# Patient Record
Sex: FEMALE | Race: WHITE | NOT HISPANIC OR LATINO | Employment: FULL TIME | URBAN - METROPOLITAN AREA
[De-identification: names, ages, dates, MRNs, and addresses within clinical notes are randomized per-mention and may not be internally consistent; named-entity substitution may affect disease eponyms.]

---

## 2023-09-26 ENCOUNTER — HOSPITAL ENCOUNTER (OUTPATIENT)
Dept: DATA CONVERSION | Facility: HOSPITAL | Age: 58
Discharge: HOME | End: 2023-09-26

## 2023-09-26 DIAGNOSIS — Z12.31 ENCOUNTER FOR SCREENING MAMMOGRAM FOR MALIGNANT NEOPLASM OF BREAST: ICD-10-CM

## 2023-10-01 ENCOUNTER — HOSPITAL ENCOUNTER (OUTPATIENT)
Dept: RADIOLOGY | Facility: HOSPITAL | Age: 58
Discharge: HOME | End: 2023-10-01
Payer: COMMERCIAL

## 2023-10-01 DIAGNOSIS — Z13.6 ENCOUNTER FOR SCREENING FOR CARDIOVASCULAR DISORDERS: ICD-10-CM

## 2023-10-01 PROCEDURE — 75571 CT HRT W/O DYE W/CA TEST: CPT

## 2023-11-03 ENCOUNTER — OFFICE VISIT (OUTPATIENT)
Dept: PRIMARY CARE | Facility: CLINIC | Age: 58
End: 2023-11-03
Payer: COMMERCIAL

## 2023-11-03 VITALS
HEART RATE: 88 BPM | SYSTOLIC BLOOD PRESSURE: 118 MMHG | HEIGHT: 62 IN | OXYGEN SATURATION: 98 % | DIASTOLIC BLOOD PRESSURE: 76 MMHG | WEIGHT: 133 LBS | BODY MASS INDEX: 24.48 KG/M2

## 2023-11-03 DIAGNOSIS — J00 NASOPHARYNGITIS ACUTE: ICD-10-CM

## 2023-11-03 DIAGNOSIS — B30.9 VIRAL CONJUNCTIVITIS: Primary | ICD-10-CM

## 2023-11-03 PROCEDURE — 99212 OFFICE O/P EST SF 10 MIN: CPT

## 2023-11-03 PROCEDURE — 1036F TOBACCO NON-USER: CPT

## 2023-11-03 RX ORDER — LEVOTHYROXINE SODIUM 125 UG/1
125 TABLET ORAL DAILY
COMMUNITY
End: 2023-11-27 | Stop reason: SDUPTHER

## 2023-11-03 ASSESSMENT — ENCOUNTER SYMPTOMS
HEADACHES: 1
EYE ITCHING: 0
DOUBLE VISION: 0
SORE THROAT: 0
FEVER: 0
EYE DISCHARGE: 1
RHINORRHEA: 0
EYE PAIN: 0
EYE REDNESS: 1

## 2023-11-03 ASSESSMENT — PATIENT HEALTH QUESTIONNAIRE - PHQ9
1. LITTLE INTEREST OR PLEASURE IN DOING THINGS: NOT AT ALL
2. FEELING DOWN, DEPRESSED OR HOPELESS: NOT AT ALL
SUM OF ALL RESPONSES TO PHQ9 QUESTIONS 1 AND 2: 0

## 2023-11-03 ASSESSMENT — PAIN SCALES - GENERAL: PAINLEVEL: 0-NO PAIN

## 2023-11-03 NOTE — PROGRESS NOTES
"Subjective   Patient ID: Allyson FRENCH is a 58 y.o. female who presents for Conjunctivitis (Pt c/o eye drainage, redness /la).    Conjunctivitis   The current episode started 2 days ago. The onset was sudden. The problem has been gradually worsening. Associated symptoms include congestion, headaches, eye discharge and eye redness. Pertinent negatives include no fever, no decreased vision, no double vision, no eye itching, no rhinorrhea, no sore throat and no eye pain. The eye pain is mild.        Review of Systems   Constitutional:  Negative for fever.   HENT:  Positive for congestion. Negative for rhinorrhea and sore throat.    Eyes:  Positive for discharge and redness. Negative for double vision, pain and itching.   Neurological:  Positive for headaches.       Objective   /76   Pulse 88   Ht 1.575 m (5' 2\")   Wt 60.3 kg (133 lb)   SpO2 98%   BMI 24.33 kg/m²     Physical Exam  Constitutional:       General: She is not in acute distress.     Appearance: Normal appearance.   HENT:      Right Ear: Tympanic membrane and ear canal normal.      Left Ear: Ear canal normal. Tympanic membrane is scarred.      Nose: Congestion present. No rhinorrhea.      Right Turbinates: Enlarged and swollen. Not pale.      Left Turbinates: Enlarged and swollen. Not pale.      Mouth/Throat:      Mouth: Mucous membranes are moist. No oral lesions.      Pharynx: Uvula midline. No oropharyngeal exudate or posterior oropharyngeal erythema.   Eyes:      Conjunctiva/sclera:      Right eye: Right conjunctiva is injected. Exudate present.      Left eye: Left conjunctiva is injected. Exudate present.   Cardiovascular:      Rate and Rhythm: Normal rate and regular rhythm.   Pulmonary:      Effort: Pulmonary effort is normal.   Lymphadenopathy:      Cervical: No cervical adenopathy.   Skin:     General: Skin is warm and dry.      Findings: No rash.   Neurological:      Mental Status: She is alert.   Psychiatric:         Mood and Affect: " Mood and affect normal.         Assessment/Plan   Diagnoses and all orders for this visit:  Viral conjunctivitis  Nasopharyngitis acute  -Instructed to use Patady OTC eye drops and warm compresses for pink eye. Educated not bacterial and does not need antibiotics.   -Educated with supportive care of increase fluids, rest, nasal saline 4x a day, Musinex, Zyrtec-D

## 2023-11-24 ENCOUNTER — LAB (OUTPATIENT)
Dept: LAB | Facility: LAB | Age: 58
End: 2023-11-24
Payer: COMMERCIAL

## 2023-11-24 DIAGNOSIS — E55.9 VITAMIN D DEFICIENCY, UNSPECIFIED: ICD-10-CM

## 2023-11-24 DIAGNOSIS — E03.9 HYPOTHYROIDISM, UNSPECIFIED: ICD-10-CM

## 2023-11-24 DIAGNOSIS — R53.83 OTHER FATIGUE: ICD-10-CM

## 2023-11-24 DIAGNOSIS — E78.5 HYPERLIPIDEMIA, UNSPECIFIED: Primary | ICD-10-CM

## 2023-11-24 DIAGNOSIS — Z00.00 ENCOUNTER FOR GENERAL ADULT MEDICAL EXAMINATION WITHOUT ABNORMAL FINDINGS: ICD-10-CM

## 2023-11-24 DIAGNOSIS — E53.8 DEFICIENCY OF OTHER SPECIFIED B GROUP VITAMINS: ICD-10-CM

## 2023-11-24 DIAGNOSIS — R35.0 FREQUENCY OF MICTURITION: ICD-10-CM

## 2023-11-24 LAB
25(OH)D3 SERPL-MCNC: 40 NG/ML (ref 31–100)
ALBUMIN SERPL-MCNC: 4.5 G/DL (ref 3.5–5)
ALP BLD-CCNC: 80 U/L (ref 35–125)
ALT SERPL-CCNC: 22 U/L (ref 5–40)
ANION GAP SERPL CALC-SCNC: 11 MMOL/L
APPEARANCE UR: CLEAR
AST SERPL-CCNC: 24 U/L (ref 5–40)
BILIRUB SERPL-MCNC: 0.4 MG/DL (ref 0.1–1.2)
BILIRUB UR STRIP.AUTO-MCNC: NEGATIVE MG/DL
BUN SERPL-MCNC: 16 MG/DL (ref 8–25)
CALCIUM SERPL-MCNC: 9.8 MG/DL (ref 8.5–10.4)
CHLORIDE SERPL-SCNC: 102 MMOL/L (ref 97–107)
CHOLEST SERPL-MCNC: 255 MG/DL (ref 133–200)
CHOLEST/HDLC SERPL: 4.2 {RATIO}
CO2 SERPL-SCNC: 28 MMOL/L (ref 24–31)
COLOR UR: NORMAL
CREAT SERPL-MCNC: 0.9 MG/DL (ref 0.4–1.6)
ERYTHROCYTE [DISTWIDTH] IN BLOOD BY AUTOMATED COUNT: 13 % (ref 11.5–14.5)
GFR SERPL CREATININE-BSD FRML MDRD: 74 ML/MIN/1.73M*2
GLUCOSE SERPL-MCNC: 83 MG/DL (ref 65–99)
GLUCOSE UR STRIP.AUTO-MCNC: NORMAL MG/DL
HCT VFR BLD AUTO: 43.2 % (ref 36–46)
HDLC SERPL-MCNC: 61 MG/DL
HGB BLD-MCNC: 13.7 G/DL (ref 12–16)
KETONES UR STRIP.AUTO-MCNC: NEGATIVE MG/DL
LDLC SERPL CALC-MCNC: 154 MG/DL (ref 65–130)
LEUKOCYTE ESTERASE UR QL STRIP.AUTO: NEGATIVE
MCH RBC QN AUTO: 27.5 PG (ref 26–34)
MCHC RBC AUTO-ENTMCNC: 31.7 G/DL (ref 32–36)
MCV RBC AUTO: 87 FL (ref 80–100)
NITRITE UR QL STRIP.AUTO: NEGATIVE
NRBC BLD-RTO: 0 /100 WBCS (ref 0–0)
PH UR STRIP.AUTO: 5 [PH]
PLATELET # BLD AUTO: 287 X10*3/UL (ref 150–450)
POTASSIUM SERPL-SCNC: 4.2 MMOL/L (ref 3.4–5.1)
PROT SERPL-MCNC: 6.8 G/DL (ref 5.9–7.9)
PROT UR STRIP.AUTO-MCNC: NEGATIVE MG/DL
RBC # BLD AUTO: 4.99 X10*6/UL (ref 4–5.2)
RBC # UR STRIP.AUTO: NEGATIVE /UL
SODIUM SERPL-SCNC: 141 MMOL/L (ref 133–145)
SP GR UR STRIP.AUTO: 1.01
T4 FREE SERPL-MCNC: 1.9 NG/DL (ref 0.9–1.7)
TRIGL SERPL-MCNC: 200 MG/DL (ref 40–150)
TSH SERPL DL<=0.05 MIU/L-ACNC: 0.28 MIU/L (ref 0.27–4.2)
UROBILINOGEN UR STRIP.AUTO-MCNC: NORMAL MG/DL
VIT B12 SERPL-MCNC: 1197 PG/ML (ref 211–946)
WBC # BLD AUTO: 7.1 X10*3/UL (ref 4.4–11.3)

## 2023-11-24 PROCEDURE — 82607 VITAMIN B-12: CPT

## 2023-11-24 PROCEDURE — 84443 ASSAY THYROID STIM HORMONE: CPT

## 2023-11-24 PROCEDURE — 80061 LIPID PANEL: CPT

## 2023-11-24 PROCEDURE — 80053 COMPREHEN METABOLIC PANEL: CPT

## 2023-11-24 PROCEDURE — 81003 URINALYSIS AUTO W/O SCOPE: CPT

## 2023-11-24 PROCEDURE — 85027 COMPLETE CBC AUTOMATED: CPT

## 2023-11-24 PROCEDURE — 84439 ASSAY OF FREE THYROXINE: CPT

## 2023-11-24 PROCEDURE — 36415 COLL VENOUS BLD VENIPUNCTURE: CPT

## 2023-11-24 PROCEDURE — 82306 VITAMIN D 25 HYDROXY: CPT

## 2023-11-27 ENCOUNTER — TELEPHONE (OUTPATIENT)
Dept: PRIMARY CARE | Facility: CLINIC | Age: 58
End: 2023-11-27
Payer: COMMERCIAL

## 2023-11-27 DIAGNOSIS — E03.9 ACQUIRED HYPOTHYROIDISM: Primary | ICD-10-CM

## 2023-11-27 DIAGNOSIS — E03.9 HYPOTHYROIDISM, UNSPECIFIED TYPE: ICD-10-CM

## 2023-11-27 RX ORDER — LEVOTHYROXINE SODIUM 112 UG/1
112 TABLET ORAL DAILY
Qty: 90 TABLET | Refills: 1 | Status: SHIPPED | OUTPATIENT
Start: 2023-11-27 | End: 2024-11-26

## 2023-11-27 NOTE — TELEPHONE ENCOUNTER
Patient notified of recent lab results. She is currently taking Levothyroxine 125 mcg. She agrees to decrease her dose. Send new RX to [facility] in [place]. Future thyroid order placed.

## 2023-11-27 NOTE — TELEPHONE ENCOUNTER
----- Message from Filippo Carroll MD sent at 11/24/2023  9:11 PM EST -----    ----- Message -----  From: Lab, Background User  Sent: 11/24/2023   7:09 PM EST  To: Filippo Carroll MD

## 2024-01-03 ENCOUNTER — TELEPHONE (OUTPATIENT)
Dept: PRIMARY CARE | Facility: CLINIC | Age: 59
End: 2024-01-03
Payer: COMMERCIAL

## 2024-01-03 NOTE — TELEPHONE ENCOUNTER
Recommend an acute visual telemed appointment for me to get more details tomorrow during my acute appointments - but I can write for her to be off

## 2024-01-03 NOTE — TELEPHONE ENCOUNTER
Patient called and stated she cut her left ring finger on 12/31/2023 and went to the ED and the did a pressure dressing. They could not due stiches or tape due to she was missing a chunk of her finger. 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 She would like to know if you could give her a note for work so she doesn't have to type all day.

## 2024-01-04 ENCOUNTER — TELEMEDICINE (OUTPATIENT)
Dept: PRIMARY CARE | Facility: CLINIC | Age: 59
End: 2024-01-04
Payer: COMMERCIAL

## 2024-01-04 VITALS — WEIGHT: 125 LBS | BODY MASS INDEX: 22.86 KG/M2

## 2024-01-04 DIAGNOSIS — S61.215A LACERATION OF LEFT RING FINGER WITHOUT FOREIGN BODY WITHOUT DAMAGE TO NAIL, INITIAL ENCOUNTER: Primary | ICD-10-CM

## 2024-01-04 DIAGNOSIS — Z12.11 ENCOUNTER FOR SCREENING FOR MALIGNANT NEOPLASM OF COLON: ICD-10-CM

## 2024-01-04 PROCEDURE — 99213 OFFICE O/P EST LOW 20 MIN: CPT | Performed by: FAMILY MEDICINE

## 2024-01-04 RX ORDER — CEPHALEXIN 500 MG/1
500 CAPSULE ORAL 2 TIMES DAILY
COMMUNITY
Start: 2023-12-29 | End: 2024-01-03

## 2024-01-04 ASSESSMENT — ENCOUNTER SYMPTOMS
DEPRESSION: 0
LOSS OF SENSATION IN FEET: 0
OCCASIONAL FEELINGS OF UNSTEADINESS: 0

## 2024-01-04 ASSESSMENT — PATIENT HEALTH QUESTIONNAIRE - PHQ9
SUM OF ALL RESPONSES TO PHQ9 QUESTIONS 1 AND 2: 0
1. LITTLE INTEREST OR PLEASURE IN DOING THINGS: NOT AT ALL
2. FEELING DOWN, DEPRESSED OR HOPELESS: NOT AT ALL

## 2024-01-04 ASSESSMENT — PAIN SCALES - GENERAL: PAINLEVEL: 5

## 2024-01-04 NOTE — PROGRESS NOTES
Subjective   Patient ID: Allyson FRENCH is a 58 y.o. female who presents for No chief complaint on file..  This visit was completed via telephone/virtual visit. All issues as documented below were discussed and addressed but no physical exam was performed. If it was felt that the patient should be evaluated via  face-to-face office appointment(s) they were directed there. The patient verbally consented to this visit.   She had a significant cut of her hand from a  - she has a significant laceration that was unable to get stitches or put together due to the skin was removed. She was placed on cephalexin and there is no sign of infection, no drainage. It bleeds when she uses her hands too much with typing at work - she is using a bandaide. Tip of her left 4th finger.         Review of Systems   Constitutional:  Positive for fatigue. Negative for chills and fever.   HENT:  Negative for congestion.    Respiratory:  Negative for cough and shortness of breath.    Cardiovascular:  Negative for chest pain and palpitations.   Gastrointestinal:  Negative for abdominal distention.   Skin:  Negative for rash.   Neurological:  Negative for dizziness, weakness and numbness.       Objective   Wt 56.7 kg (125 lb)   BMI 22.86 kg/m²     Physical Exam  Constitutional:       General: She is not in acute distress.  Pulmonary:      Effort: No respiratory distress.   Skin:     Findings: No rash.      Comments: The tip of her finger has an open flap, no erythema, no drainage, not currently bleeding   Neurological:      General: No focal deficit present.      Mental Status: She is oriented to person, place, and time.   Psychiatric:         Mood and Affect: Mood normal.         Behavior: Behavior normal.         Assessment/Plan   Problem List Items Addressed This Visit    None  Visit Diagnoses         Codes    Laceration of left ring finger without foreign body without damage to nail, initial encounter    -  Primary S67.043A     light duty note for work from 1/4/24- 1/7/24 - warm water washes an bacitracin, call if no better     Encounter for screening for malignant neoplasm of colon     Z12.11

## 2024-01-05 ASSESSMENT — ENCOUNTER SYMPTOMS
DIZZINESS: 0
FEVER: 0
PALPITATIONS: 0
WEAKNESS: 0
COUGH: 0
NUMBNESS: 0
SHORTNESS OF BREATH: 0
CHILLS: 0
ABDOMINAL DISTENTION: 0
FATIGUE: 1

## 2024-04-01 ENCOUNTER — TELEMEDICINE (OUTPATIENT)
Dept: PRIMARY CARE | Facility: CLINIC | Age: 59
End: 2024-04-01
Payer: COMMERCIAL

## 2024-04-01 VITALS
BODY MASS INDEX: 23.37 KG/M2 | SYSTOLIC BLOOD PRESSURE: 116 MMHG | HEIGHT: 62 IN | WEIGHT: 127 LBS | DIASTOLIC BLOOD PRESSURE: 70 MMHG | TEMPERATURE: 97.8 F | OXYGEN SATURATION: 97 %

## 2024-04-01 DIAGNOSIS — U07.1 COVID-19: Primary | ICD-10-CM

## 2024-04-01 DIAGNOSIS — L03.90 CELLULITIS, UNSPECIFIED CELLULITIS SITE: ICD-10-CM

## 2024-04-01 DIAGNOSIS — J45.21 MILD INTERMITTENT REACTIVE AIRWAY DISEASE WITH ACUTE EXACERBATION (HHS-HCC): ICD-10-CM

## 2024-04-01 PROCEDURE — 99213 OFFICE O/P EST LOW 20 MIN: CPT | Performed by: FAMILY MEDICINE

## 2024-04-01 PROCEDURE — 1036F TOBACCO NON-USER: CPT | Performed by: FAMILY MEDICINE

## 2024-04-01 RX ORDER — ALBUTEROL SULFATE 90 UG/1
2 AEROSOL, METERED RESPIRATORY (INHALATION) EVERY 6 HOURS PRN
Qty: 8 G | Refills: 5 | Status: SHIPPED | OUTPATIENT
Start: 2024-04-01 | End: 2025-04-01

## 2024-04-01 RX ORDER — DOXYCYCLINE 100 MG/1
100 CAPSULE ORAL 2 TIMES DAILY
Qty: 20 CAPSULE | Refills: 0 | Status: SHIPPED | OUTPATIENT
Start: 2024-04-01 | End: 2024-04-11

## 2024-04-01 ASSESSMENT — ENCOUNTER SYMPTOMS
SHORTNESS OF BREATH: 0
RHINORRHEA: 1
COUGH: 1
SORE THROAT: 0
MYALGIAS: 1
CHILLS: 0
HEADACHES: 1
FEVER: 0
SWEATS: 0
HEARTBURN: 0
WHEEZING: 0
HEMOPTYSIS: 0
WEIGHT LOSS: 0

## 2024-04-01 ASSESSMENT — PAIN SCALES - GENERAL: PAINLEVEL: 0-NO PAIN

## 2024-04-01 NOTE — PROGRESS NOTES
"Subjective   Patient ID: Allyson Gillespie is a 58 y.o. female who presents for Covid-19 Home Monitoring Visit.  This visit was completed via telephone/virtual visit. All issues as documented below were discussed and addressed but no physical exam was performed. If it was felt that the patient should be evaluated via  face-to-face office appointment(s) they were directed there. The patient verbally consented to this visit.   Symptoms started on 3-4 days ago, tested positive for covid - she declines using paxlovid - she did not find that helpful the last time she had covid. She did get relief with using an albuterol inhaler. She also has some dysuria and a crusted skin patch under her left nostril. Denies fevers/ chills. Gets into coughing spells    Cough  This is a new problem. The current episode started in the past 7 days. The problem has been unchanged. The problem occurs every few minutes. The cough is Non-productive. Associated symptoms include headaches, myalgias, nasal congestion, postnasal drip and rhinorrhea. Pertinent negatives include no chest pain, chills, ear congestion, ear pain, fever, heartburn, hemoptysis, rash, sore throat, shortness of breath, sweats, weight loss or wheezing. The symptoms are aggravated by exercise and lying down. She has tried OTC cough suppressant and rest for the symptoms. The treatment provided no relief.        Review of Systems   Constitutional:  Negative for chills, fever and weight loss.   HENT:  Positive for postnasal drip and rhinorrhea. Negative for ear pain and sore throat.    Respiratory:  Positive for cough. Negative for hemoptysis, shortness of breath and wheezing.    Cardiovascular:  Negative for chest pain.   Gastrointestinal:  Negative for heartburn.   Musculoskeletal:  Positive for myalgias.   Skin:  Negative for rash.   Neurological:  Positive for headaches.       Objective   /70   Temp 36.6 °C (97.8 °F)   Ht 1.575 m (5' 2\")   Wt 57.6 kg (127 lb)   SpO2 " 97%   BMI 23.23 kg/m²     Physical Exam  Constitutional:       General: She is not in acute distress.     Appearance: She is not ill-appearing.   Pulmonary:      Effort: Pulmonary effort is normal.   Skin:     Findings: Rash present.      Comments: Crusted small lesion under her left nostril   Neurological:      General: No focal deficit present.      Mental Status: She is oriented to person, place, and time.   Psychiatric:         Mood and Affect: Mood normal.         Behavior: Behavior normal.         Assessment/Plan   Problem List Items Addressed This Visit    None  Visit Diagnoses         Codes    COVID-19    -  Primary U07.1    declines paxlovid- encourage fluids, NSAIDS as needed, rest     Mild intermittent reactive airway disease with acute exacerbation     J45.21    Relevant Medications    albuterol (Ventolin HFA) 90 mcg/actuation inhaler    Cellulitis, unspecified cellulitis site     L03.90    Relevant Medications    doxycycline (Vibramycin) 100 mg capsule

## 2024-05-14 ENCOUNTER — TELEPHONE (OUTPATIENT)
Dept: PRIMARY CARE | Facility: CLINIC | Age: 59
End: 2024-05-14
Payer: COMMERCIAL

## 2024-05-14 NOTE — TELEPHONE ENCOUNTER
Patient called stating she has a bluish bruise on the back of her calf that she noticed recently. She said she is usually sitting down for a majority of the day and It is not getting larger or anything, but she feels it should have gone away by now. Patient would like to know what she should do, like if this is something she needs to come in office for, if some kind of imaging is an option?

## 2024-06-17 ENCOUNTER — OFFICE VISIT (OUTPATIENT)
Dept: PRIMARY CARE | Facility: CLINIC | Age: 59
End: 2024-06-17
Payer: COMMERCIAL

## 2024-06-17 ENCOUNTER — HOSPITAL ENCOUNTER (OUTPATIENT)
Dept: RADIOLOGY | Facility: CLINIC | Age: 59
Discharge: HOME | End: 2024-06-17
Payer: COMMERCIAL

## 2024-06-17 VITALS
DIASTOLIC BLOOD PRESSURE: 74 MMHG | BODY MASS INDEX: 24.48 KG/M2 | WEIGHT: 133 LBS | HEART RATE: 79 BPM | OXYGEN SATURATION: 97 % | HEIGHT: 62 IN | SYSTOLIC BLOOD PRESSURE: 114 MMHG

## 2024-06-17 DIAGNOSIS — M54.50 CHRONIC BILATERAL LOW BACK PAIN WITHOUT SCIATICA: ICD-10-CM

## 2024-06-17 DIAGNOSIS — M54.2 NECK PAIN: ICD-10-CM

## 2024-06-17 DIAGNOSIS — E03.9 HYPOTHYROIDISM, UNSPECIFIED TYPE: ICD-10-CM

## 2024-06-17 DIAGNOSIS — G89.29 CHRONIC BILATERAL LOW BACK PAIN WITHOUT SCIATICA: ICD-10-CM

## 2024-06-17 DIAGNOSIS — M62.838 SPASM OF MUSCLE: ICD-10-CM

## 2024-06-17 DIAGNOSIS — I83.899 SYMPTOMATIC SPIDER VARICOSE VEIN: Primary | ICD-10-CM

## 2024-06-17 PROCEDURE — 99214 OFFICE O/P EST MOD 30 MIN: CPT | Performed by: FAMILY MEDICINE

## 2024-06-17 PROCEDURE — 1036F TOBACCO NON-USER: CPT | Performed by: FAMILY MEDICINE

## 2024-06-17 PROCEDURE — 72114 X-RAY EXAM L-S SPINE BENDING: CPT | Performed by: RADIOLOGY

## 2024-06-17 PROCEDURE — 72114 X-RAY EXAM L-S SPINE BENDING: CPT

## 2024-06-17 RX ORDER — CYCLOBENZAPRINE HCL 5 MG
5 TABLET ORAL NIGHTLY PRN
Qty: 30 TABLET | Refills: 0 | Status: SHIPPED | OUTPATIENT
Start: 2024-06-17 | End: 2024-08-16

## 2024-06-17 ASSESSMENT — ENCOUNTER SYMPTOMS
TREMORS: 0
DIZZINESS: 0
LOSS OF SENSATION IN FEET: 0
OCCASIONAL FEELINGS OF UNSTEADINESS: 0
NERVOUS/ANXIOUS: 0
ARTHRALGIAS: 1
WEAKNESS: 0
COUGH: 0
DEPRESSION: 0
SHORTNESS OF BREATH: 0
PALPITATIONS: 0
CHILLS: 0
NAUSEA: 0
HEMATURIA: 0
CONSTIPATION: 0
CONFUSION: 0
NUMBNESS: 0
FATIGUE: 1
FEVER: 0
WHEEZING: 0
FREQUENCY: 0
VOMITING: 0
MYALGIAS: 1

## 2024-06-17 ASSESSMENT — PATIENT HEALTH QUESTIONNAIRE - PHQ9
SUM OF ALL RESPONSES TO PHQ9 QUESTIONS 1 AND 2: 0
2. FEELING DOWN, DEPRESSED OR HOPELESS: NOT AT ALL
1. LITTLE INTEREST OR PLEASURE IN DOING THINGS: NOT AT ALL

## 2024-06-17 ASSESSMENT — PAIN SCALES - GENERAL: PAINLEVEL: 6

## 2024-06-17 NOTE — PROGRESS NOTES
"Subjective   Patient ID: Allyson Gillespie is a 59 y.o. female who presents for Leg Cramps (Wake up at night with cramps).    Presents today for several concerns- bilateral lower leg pain, aching - superficial and deep veins- sits a lot with her job    Increased stress with her job, troubles with sleeping - agrees to try magnesium glycinate    Neck, low back and tight neck muscles - has to sit a lot at work and had troubles with feeling tight at night. Long history of back pain - intermittent leg pain, no loss of bowel or bladder function             Review of Systems   Constitutional:  Positive for fatigue. Negative for chills and fever.   HENT:  Negative for ear pain and postnasal drip.    Respiratory:  Negative for cough, shortness of breath and wheezing.    Cardiovascular:  Negative for chest pain, palpitations and leg swelling.   Gastrointestinal:  Negative for constipation, nausea and vomiting.   Genitourinary:  Negative for frequency and hematuria.   Musculoskeletal:  Positive for arthralgias and myalgias.   Skin:  Negative for rash.   Neurological:  Negative for dizziness, tremors, weakness and numbness.   Psychiatric/Behavioral:  Negative for confusion. The patient is not nervous/anxious.        Objective   /74   Pulse 79   Ht 1.575 m (5' 2\")   Wt 60.3 kg (133 lb)   SpO2 97%   BMI 24.33 kg/m²     Physical Exam  Vitals reviewed.   Constitutional:       Appearance: Normal appearance.   Cardiovascular:      Rate and Rhythm: Normal rate and regular rhythm.      Heart sounds: Normal heart sounds. No murmur heard.  Pulmonary:      Breath sounds: Normal breath sounds.   Abdominal:      General: Abdomen is flat. Bowel sounds are normal.      Palpations: Abdomen is soft.   Musculoskeletal:      Right lower leg: No edema.      Left lower leg: No edema.      Comments: Spider and varicose veins in both lower ext. Diffuse low back pain and paraspinal muscle and neck spasms.   Neurological:      Mental Status: She " is alert.         Assessment/Plan   Problem List Items Addressed This Visit    None  Visit Diagnoses         Codes    Symptomatic spider varicose vein    -  Primary I83.899    referral to Dr Massey - recommend BTK pressure stockings     Relevant Orders    Referral to Vascular Surgery    Spasm of muscle     M62.838    neck stretches     Relevant Medications    cyclobenzaprine (Flexeril) 5 mg tablet    Neck pain     M54.2    Chronic bilateral low back pain without sciatica     M54.50, G89.29    low back stretches     Relevant Orders    XR lumbar spine 6+ views including oblique flexion extension    Hypothyroidism, unspecified type     E03.9    continue current meds

## 2024-08-30 DIAGNOSIS — E03.9 ACQUIRED HYPOTHYROIDISM: ICD-10-CM

## 2024-08-31 RX ORDER — LEVOTHYROXINE SODIUM 112 UG/1
112 TABLET ORAL DAILY
Qty: 90 TABLET | Refills: 1 | Status: SHIPPED | OUTPATIENT
Start: 2024-08-31 | End: 2025-08-31

## 2024-09-14 ENCOUNTER — LAB (OUTPATIENT)
Dept: LAB | Facility: LAB | Age: 59
End: 2024-09-14
Payer: COMMERCIAL

## 2024-09-14 DIAGNOSIS — E03.9 HYPOTHYROIDISM, UNSPECIFIED TYPE: ICD-10-CM

## 2024-09-14 DIAGNOSIS — E03.9 ACQUIRED HYPOTHYROIDISM: ICD-10-CM

## 2024-09-14 LAB
T4 FREE SERPL-MCNC: 1.22 NG/DL (ref 0.78–1.48)
TSH SERPL-ACNC: 8.07 MIU/L (ref 0.44–3.98)

## 2024-09-14 PROCEDURE — 36415 COLL VENOUS BLD VENIPUNCTURE: CPT

## 2024-09-19 LAB — NONINV COLON CA DNA+OCC BLD SCRN STL QL: NEGATIVE

## 2025-03-25 DIAGNOSIS — E03.9 ACQUIRED HYPOTHYROIDISM: ICD-10-CM

## 2025-03-25 RX ORDER — LEVOTHYROXINE SODIUM 112 UG/1
112 TABLET ORAL DAILY
Qty: 90 TABLET | Refills: 0 | Status: SHIPPED | OUTPATIENT
Start: 2025-03-25 | End: 2026-03-25

## 2025-04-11 DIAGNOSIS — E03.9 ACQUIRED HYPOTHYROIDISM: ICD-10-CM

## 2025-04-11 RX ORDER — LEVOTHYROXINE SODIUM 112 UG/1
112 TABLET ORAL DAILY
Qty: 90 TABLET | Refills: 0 | Status: SHIPPED | OUTPATIENT
Start: 2025-04-11 | End: 2026-04-11

## 2025-05-29 ENCOUNTER — OFFICE VISIT (OUTPATIENT)
Dept: PRIMARY CARE | Facility: CLINIC | Age: 60
End: 2025-05-29
Payer: COMMERCIAL

## 2025-05-29 VITALS
DIASTOLIC BLOOD PRESSURE: 70 MMHG | BODY MASS INDEX: 24.37 KG/M2 | HEIGHT: 62 IN | WEIGHT: 132.4 LBS | SYSTOLIC BLOOD PRESSURE: 114 MMHG | OXYGEN SATURATION: 97 % | HEART RATE: 84 BPM

## 2025-05-29 DIAGNOSIS — E03.9 ACQUIRED HYPOTHYROIDISM: ICD-10-CM

## 2025-05-29 DIAGNOSIS — Z78.0 MENOPAUSE: ICD-10-CM

## 2025-05-29 DIAGNOSIS — Z12.31 SCREENING MAMMOGRAM FOR BREAST CANCER: ICD-10-CM

## 2025-05-29 DIAGNOSIS — Z00.00 ROUTINE GENERAL MEDICAL EXAMINATION AT A HEALTH CARE FACILITY: Primary | ICD-10-CM

## 2025-05-29 DIAGNOSIS — J45.21 MILD INTERMITTENT REACTIVE AIRWAY DISEASE WITH ACUTE EXACERBATION (HHS-HCC): ICD-10-CM

## 2025-05-29 DIAGNOSIS — Z12.11 ENCOUNTER FOR SCREENING FOR MALIGNANT NEOPLASM OF COLON: ICD-10-CM

## 2025-05-29 DIAGNOSIS — N95.2 ATROPHIC VAGINITIS: ICD-10-CM

## 2025-05-29 PROCEDURE — 3008F BODY MASS INDEX DOCD: CPT | Performed by: FAMILY MEDICINE

## 2025-05-29 PROCEDURE — 99396 PREV VISIT EST AGE 40-64: CPT | Performed by: FAMILY MEDICINE

## 2025-05-29 RX ORDER — LEVOTHYROXINE SODIUM 112 UG/1
112 TABLET ORAL DAILY
Qty: 90 TABLET | Refills: 0 | Status: SHIPPED | OUTPATIENT
Start: 2025-05-29 | End: 2026-05-29

## 2025-05-29 ASSESSMENT — PATIENT HEALTH QUESTIONNAIRE - PHQ9
2. FEELING DOWN, DEPRESSED OR HOPELESS: NOT AT ALL
1. LITTLE INTEREST OR PLEASURE IN DOING THINGS: NOT AT ALL
SUM OF ALL RESPONSES TO PHQ9 QUESTIONS 1 AND 2: 0

## 2025-05-29 ASSESSMENT — PROMIS GLOBAL HEALTH SCALE
CARRYOUT_SOCIAL_ACTIVITIES: VERY GOOD
RATE_GENERAL_HEALTH: GOOD
RATE_AVERAGE_FATIGUE: MILD
RATE_MENTAL_HEALTH: FAIR
EMOTIONAL_PROBLEMS: SOMETIMES
CARRYOUT_PHYSICAL_ACTIVITIES: COMPLETELY
RATE_AVERAGE_PAIN: 0
RATE_QUALITY_OF_LIFE: GOOD
RATE_SOCIAL_SATISFACTION: GOOD
RATE_PHYSICAL_HEALTH: GOOD

## 2025-05-29 ASSESSMENT — PAIN SCALES - GENERAL: PAINLEVEL_OUTOF10: 0-NO PAIN

## 2025-05-29 NOTE — PROGRESS NOTES
"Subjective   Patient ID: Allyson Gillespie is a 60 y.o. female who presents for Annual Exam.     Medications reviewed:          Current medications Use reviewed and recorded. Vitamin/Mineral supplements Use reviewed and recorded.      Review family history:          Reviewed See family history.      Review past history:          Reviewed See past history.      Review surgical history:          Reviewed See surgical history.    Cologuard neg 2024  Pap with neg HPV 2021  Known thyroid disease - stable on meds  Due for bone density - Recommend a vitamin D supplement, dietary calcium, weight bearing exercises and limited to no caffeine            Review of Systems   Constitutional:  Positive for fatigue. Negative for chills and fever.   HENT:  Negative for ear pain and postnasal drip.    Respiratory:  Negative for shortness of breath and wheezing.    Cardiovascular:  Negative for chest pain, palpitations and leg swelling.   Gastrointestinal:  Negative for constipation, nausea and vomiting.   Genitourinary:  Negative for frequency and hematuria.   Musculoskeletal:  Negative for arthralgias and myalgias.   Skin:  Negative for rash.   Neurological:  Negative for dizziness and weakness.   Psychiatric/Behavioral:  Negative for confusion. The patient is not nervous/anxious.        Objective   /70   Pulse 84   Ht 1.575 m (5' 2\")   Wt 60.1 kg (132 lb 6.4 oz)   SpO2 97%   BMI 24.22 kg/m²     Physical Exam  Exam conducted with a chaperone present.   Constitutional:       General: She is not in acute distress.     Appearance: Normal appearance. She is not ill-appearing.   HENT:      Head: Normocephalic.      Right Ear: Tympanic membrane and external ear normal.      Left Ear: Tympanic membrane and external ear normal.      Nose: Nose normal. No congestion.      Mouth/Throat:      Mouth: Mucous membranes are moist.      Pharynx: No posterior oropharyngeal erythema.   Eyes:      Extraocular Movements: Extraocular movements " intact.      Conjunctiva/sclera: Conjunctivae normal.      Pupils: Pupils are equal, round, and reactive to light.   Cardiovascular:      Rate and Rhythm: Normal rate and regular rhythm.      Pulses: Normal pulses.      Heart sounds: Normal heart sounds. No murmur heard.  Pulmonary:      Effort: Pulmonary effort is normal.      Breath sounds: Normal breath sounds. No wheezing.   Chest:   Breasts:     Right: Normal. No mass, nipple discharge, skin change or tenderness.      Left: Normal. No mass, nipple discharge, skin change or tenderness.   Abdominal:      General: Bowel sounds are normal.      Palpations: Abdomen is soft. There is no mass.      Tenderness: There is no abdominal tenderness.   Genitourinary:     Comments: declines  Musculoskeletal:         General: Normal range of motion.      Cervical back: Neck supple. No tenderness.      Right lower leg: No edema.      Left lower leg: No edema.   Lymphadenopathy:      Cervical: No cervical adenopathy.   Skin:     General: Skin is warm.      Findings: No rash.   Neurological:      General: No focal deficit present.      Mental Status: She is alert and oriented to person, place, and time.      Gait: Gait normal.   Psychiatric:         Mood and Affect: Mood normal.         Behavior: Behavior normal.         Assessment/Plan   Problem List Items Addressed This Visit           ICD-10-CM    Hypothyroid E03.9    Relevant Medications    levothyroxine (Synthroid, Levoxyl) 112 mcg tablet    Mild intermittent reactive airway disease with acute exacerbation ([facility]) J45.21     Other Visit Diagnoses         Codes      Routine general medical examination at a health care facility    -  Primary Z00.00    recommend shingrix at her pharmacy       Screening mammogram for breast cancer     Z12.31    Relevant Orders    BI mammo bilateral screening tomosynthesis      Menopause     Z78.0    Relevant Orders    XR DEXA bone density      Atrophic vaginitis     N95.2    will add vaginal  estrogen cream       Encounter for screening for malignant neoplasm of colon     Z12.11    cologuard neg 2024

## 2025-06-01 PROBLEM — G47.00 INSOMNIA: Status: ACTIVE | Noted: 2025-06-01

## 2025-06-01 PROBLEM — E03.9 HYPOTHYROID: Status: ACTIVE | Noted: 2025-06-01

## 2025-06-01 PROBLEM — E55.9 VITAMIN D DEFICIENCY: Status: ACTIVE | Noted: 2025-06-01

## 2025-06-01 PROBLEM — F41.9 ANXIETY: Status: ACTIVE | Noted: 2025-06-01

## 2025-06-01 PROBLEM — E78.5 HYPERLIPIDEMIA: Status: ACTIVE | Noted: 2025-06-01

## 2025-06-01 PROBLEM — R53.82 CHRONIC FATIGUE: Status: ACTIVE | Noted: 2025-06-01

## 2025-06-01 PROBLEM — J45.21 MILD INTERMITTENT REACTIVE AIRWAY DISEASE WITH ACUTE EXACERBATION (HHS-HCC): Status: ACTIVE | Noted: 2025-06-01

## 2025-06-01 ASSESSMENT — ENCOUNTER SYMPTOMS
HEMATURIA: 0
VOMITING: 0
NERVOUS/ANXIOUS: 0
WEAKNESS: 0
CONSTIPATION: 0
SHORTNESS OF BREATH: 0
DIZZINESS: 0
WHEEZING: 0
FEVER: 0
PALPITATIONS: 0
CHILLS: 0
NAUSEA: 0
CONFUSION: 0
MYALGIAS: 0
ARTHRALGIAS: 0
FATIGUE: 1
FREQUENCY: 0